# Patient Record
Sex: MALE | Race: WHITE | Employment: FULL TIME | URBAN - NONMETROPOLITAN AREA
[De-identification: names, ages, dates, MRNs, and addresses within clinical notes are randomized per-mention and may not be internally consistent; named-entity substitution may affect disease eponyms.]

---

## 2021-08-24 ENCOUNTER — OFFICE VISIT (OUTPATIENT)
Dept: PRIMARY CARE CLINIC | Age: 21
End: 2021-08-24
Payer: MEDICAID

## 2021-08-24 VITALS
TEMPERATURE: 97.8 F | SYSTOLIC BLOOD PRESSURE: 138 MMHG | OXYGEN SATURATION: 96 % | RESPIRATION RATE: 18 BRPM | WEIGHT: 290 LBS | HEART RATE: 96 BPM | DIASTOLIC BLOOD PRESSURE: 62 MMHG

## 2021-08-24 DIAGNOSIS — Z11.52 ENCOUNTER FOR SCREENING FOR COVID-19: Primary | ICD-10-CM

## 2021-08-24 DIAGNOSIS — J40 BRONCHITIS: ICD-10-CM

## 2021-08-24 DIAGNOSIS — Z20.828 EXPOSURE TO THE FLU: ICD-10-CM

## 2021-08-24 DIAGNOSIS — Z11.52 ENCOUNTER FOR SCREENING FOR COVID-19: ICD-10-CM

## 2021-08-24 DIAGNOSIS — J02.9 SORE THROAT: Primary | ICD-10-CM

## 2021-08-24 DIAGNOSIS — R06.2 WHEEZING: ICD-10-CM

## 2021-08-24 LAB
INFLUENZA A ANTIBODY: NEGATIVE
INFLUENZA B ANTIBODY: NEGATIVE
S PYO AG THROAT QL: NORMAL

## 2021-08-24 PROCEDURE — 87804 INFLUENZA ASSAY W/OPTIC: CPT | Performed by: NURSE PRACTITIONER

## 2021-08-24 PROCEDURE — G8427 DOCREV CUR MEDS BY ELIG CLIN: HCPCS | Performed by: NURSE PRACTITIONER

## 2021-08-24 PROCEDURE — 4004F PT TOBACCO SCREEN RCVD TLK: CPT | Performed by: NURSE PRACTITIONER

## 2021-08-24 PROCEDURE — 99999 PR OFFICE/OUTPT VISIT,PROCEDURE ONLY: CPT | Performed by: NURSE PRACTITIONER

## 2021-08-24 PROCEDURE — 99213 OFFICE O/P EST LOW 20 MIN: CPT | Performed by: NURSE PRACTITIONER

## 2021-08-24 PROCEDURE — 87880 STREP A ASSAY W/OPTIC: CPT | Performed by: NURSE PRACTITIONER

## 2021-08-24 PROCEDURE — G8421 BMI NOT CALCULATED: HCPCS | Performed by: NURSE PRACTITIONER

## 2021-08-24 RX ORDER — ALBUTEROL SULFATE 90 UG/1
2 AEROSOL, METERED RESPIRATORY (INHALATION) EVERY 4 HOURS PRN
Qty: 1 INHALER | Refills: 0 | Status: SHIPPED | OUTPATIENT
Start: 2021-08-24

## 2021-08-24 RX ORDER — BUPROPION HYDROCHLORIDE 150 MG/1
150 TABLET ORAL EVERY MORNING
COMMUNITY

## 2021-08-24 RX ORDER — OSELTAMIVIR PHOSPHATE 75 MG/1
75 CAPSULE ORAL DAILY
Qty: 10 CAPSULE | Refills: 0 | Status: SHIPPED | OUTPATIENT
Start: 2021-08-24 | End: 2021-09-03

## 2021-08-24 RX ORDER — METHYLPREDNISOLONE SODIUM SUCCINATE 125 MG/2ML
125 INJECTION, POWDER, LYOPHILIZED, FOR SOLUTION INTRAMUSCULAR; INTRAVENOUS ONCE
Status: COMPLETED | OUTPATIENT
Start: 2021-08-24 | End: 2021-08-24

## 2021-08-24 RX ADMIN — METHYLPREDNISOLONE SODIUM SUCCINATE 125 MG: 125 INJECTION, POWDER, LYOPHILIZED, FOR SOLUTION INTRAMUSCULAR; INTRAVENOUS at 14:44

## 2021-08-24 ASSESSMENT — ENCOUNTER SYMPTOMS
SORE THROAT: 1
DIARRHEA: 0
VOMITING: 0
SHORTNESS OF BREATH: 0
NAUSEA: 0
COUGH: 1
CHEST TIGHTNESS: 0
WHEEZING: 1

## 2021-08-24 NOTE — PATIENT INSTRUCTIONS
Patient REFUSED COVID TESTING DUE TO just exposed to INFLUENZA in the household. STEROID INJ TODAY. RX antiviral and inhaler to pharmacy on file. If shortness of breath or worsening symptoms please seek care at nearest ER.

## 2021-08-24 NOTE — PROGRESS NOTES
[address] [facility] [place] [address]  Dept: [phone]  Dept Fax: [phone]: [phone]     Visit type: New patient    Reason for Visit: Pharyngitis (Since this morning. Patient states spouse and child tested positive for flu lastweek) and Nasal Congestion (Nasal drainage since this morning)      Assessment and Plan       1. Sore throat  -     POCT rapid strep A  -     POCT Influenza A/B  2. Encounter for screening for COVID-19  3. Exposure to the flu  -     oseltamivir (TAMIFLU) 75 MG capsule; Take 1 capsule by mouth daily for 10 days, Disp-10 capsule, R-0Normal  4. Wheezing  -     albuterol sulfate HFA (VENTOLIN HFA) 108 (90 Base) MCG/ACT inhaler; Inhale 2 puffs into the lungs every 4 hours as needed for Wheezing, Disp-1 Inhaler, R-0Normal  5. Bronchitis      ICD-10-CM    1. Sore throat  J02.9 POCT rapid strep A     POCT Influenza A/B   2. Encounter for screening for COVID-19  Z11.52    3. Exposure to the flu  Z20.828 oseltamivir (TAMIFLU) 75 MG capsule   4. Wheezing  R06.2 albuterol sulfate HFA (VENTOLIN HFA) 108 (90 Base) MCG/ACT inhaler   5. Bronchitis  J40          Return if symptoms worsen or fail to improve. Patient REFUSED COVID TESTING DUE TO just exposed to INFLUENZA in the household. STEROID INJ TODAY. RX antiviral and inhaler to pharmacy on file. If shortness of breath or worsening symptoms please seek care at nearest ER. Provided written recommendations on viral strategies including c,d,zinc, mucinex, TYL or IBUPROFEN and when to seek emergency care . Subjective       Patient notes sudden onset sore throat and wheezing that began this morning. He notes symptoms of runny nose fatigue sudden onset last week and his children wife he tested positive for influenza at the end of last week. He is afebrile. He denies any known potential Covid exposures.   He notes that wife and child did test negative for Covid SpO2 96%   Physical Exam  Vitals and nursing note reviewed. Constitutional:       General: He is not in acute distress. Appearance: Normal appearance. He is normal weight. He is not ill-appearing, toxic-appearing or diaphoretic. HENT:      Head: Normocephalic. Right Ear: External ear normal.      Left Ear: External ear normal.      Nose: Congestion present. No rhinorrhea. Mouth/Throat:      Pharynx: Posterior oropharyngeal erythema present. No oropharyngeal exudate. Eyes:      Pupils: Pupils are equal, round, and reactive to light. Cardiovascular:      Rate and Rhythm: Normal rate. Heart sounds: Normal heart sounds. Pulmonary:      Effort: Pulmonary effort is normal.      Breath sounds: No stridor. Wheezing (diffuse lessen with deep breath and cough) present. No rhonchi. Musculoskeletal:      Cervical back: Normal range of motion. Neurological:      Mental Status: He is alert.                                DEVORA Otoole - CNP

## 2021-08-26 LAB — SARS-COV-2, PCR: NOT DETECTED

## 2022-09-16 ENCOUNTER — HOSPITAL ENCOUNTER (OUTPATIENT)
Dept: GENERAL RADIOLOGY | Age: 22
Discharge: HOME | End: 2022-09-16
Payer: COMMERCIAL

## 2022-09-16 DIAGNOSIS — N20.0 STONE, KIDNEY: ICD-10-CM

## 2022-09-16 PROCEDURE — 74018 RADEX ABDOMEN 1 VIEW: CPT | Performed by: RADIOLOGY

## 2022-09-16 PROCEDURE — 74018 RADEX ABDOMEN 1 VIEW: CPT

## 2022-11-16 ENCOUNTER — APPOINTMENT (OUTPATIENT)
Dept: GENERAL RADIOLOGY | Age: 22
End: 2022-11-16
Payer: COMMERCIAL

## 2022-11-16 ENCOUNTER — HOSPITAL ENCOUNTER (EMERGENCY)
Age: 22
Discharge: HOME | End: 2022-11-16
Attending: EMERGENCY MEDICINE
Payer: COMMERCIAL

## 2022-11-16 VITALS
SYSTOLIC BLOOD PRESSURE: 160 MMHG | TEMPERATURE: 100.6 F | WEIGHT: 280 LBS | HEART RATE: 106 BPM | OXYGEN SATURATION: 93 % | DIASTOLIC BLOOD PRESSURE: 95 MMHG | RESPIRATION RATE: 20 BRPM

## 2022-11-16 DIAGNOSIS — J10.1 INFLUENZA A: Primary | ICD-10-CM

## 2022-11-16 LAB
RAPID INFLUENZA  B AGN: NEGATIVE
RAPID INFLUENZA A AGN: POSITIVE
SARS-COV-2, NAAT: NOT DETECTED

## 2022-11-16 PROCEDURE — 87804 INFLUENZA ASSAY W/OPTIC: CPT

## 2022-11-16 PROCEDURE — 6370000000 HC RX 637 (ALT 250 FOR IP): Performed by: EMERGENCY MEDICINE

## 2022-11-16 PROCEDURE — 99284 EMERGENCY DEPT VISIT MOD MDM: CPT

## 2022-11-16 PROCEDURE — 87635 SARS-COV-2 COVID-19 AMP PRB: CPT

## 2022-11-16 PROCEDURE — 71046 X-RAY EXAM CHEST 2 VIEWS: CPT

## 2022-11-16 RX ORDER — DEXTROMETHORPHAN HYDROBROMIDE AND PROMETHAZINE HYDROCHLORIDE 15; 6.25 MG/5ML; MG/5ML
5 SYRUP ORAL 4 TIMES DAILY PRN
Qty: 100 ML | Refills: 0 | Status: SHIPPED | OUTPATIENT
Start: 2022-11-16 | End: 2022-11-16 | Stop reason: CLARIF

## 2022-11-16 RX ORDER — DEXTROMETHORPHAN HYDROBROMIDE AND PROMETHAZINE HYDROCHLORIDE 15; 6.25 MG/5ML; MG/5ML
5 SYRUP ORAL 4 TIMES DAILY PRN
Qty: 100 ML | Refills: 0 | Status: SHIPPED | OUTPATIENT
Start: 2022-11-16 | End: 2022-11-23

## 2022-11-16 RX ORDER — GUAIFENESIN AND DEXTROMETHORPHAN HYDROBROMIDE 100; 10 MG/5ML; MG/5ML
10 SOLUTION ORAL ONCE
Status: COMPLETED | OUTPATIENT
Start: 2022-11-16 | End: 2022-11-16

## 2022-11-16 RX ADMIN — GUAIFENESIN SYRUP AND DEXTROMETHORPHAN 10 ML: 100; 10 SYRUP ORAL at 13:50

## 2022-11-16 ASSESSMENT — PAIN - FUNCTIONAL ASSESSMENT: PAIN_FUNCTIONAL_ASSESSMENT: 0-10

## 2022-11-16 ASSESSMENT — ENCOUNTER SYMPTOMS
DIARRHEA: 1
SHORTNESS OF BREATH: 1
SORE THROAT: 1
ABDOMINAL PAIN: 0
VOMITING: 1
COUGH: 1

## 2022-11-16 ASSESSMENT — PAIN SCALES - GENERAL: PAINLEVEL_OUTOF10: 4

## 2022-11-16 NOTE — LETTER
[facility] EMERGENCY DEPT  [address]  Phone: [phone]               November 16, 2022    Patient: Cordell De La O   YOB: 2000   Date of Visit: 11/16/2022       To Whom It May Concern:    Cordell De La O was seen and treated in our emergency department on 11/16/2022. He may return on 11/20/22.       Sincerely,       Rochelle Gutiérrez RN         Signature:__________________________________

## 2022-11-16 NOTE — ED NOTES
Pt requesting prescriptions be changed to [facility] in [place] near [place].  Dr. Garcia Res aware at 15:20     Luisa Nieto RN  11/16/22 1821

## 2022-11-16 NOTE — ED PROVIDER NOTES
[facility] EMERGENCY DEPT  EMERGENCY DEPARTMENT ENCOUNTER      Pt Name: Jez Clark  MRN: [id number]  [place] 2000  Date of evaluation: 11/16/2022  Provider: Son [address]       Chief Complaint   Patient presents with    Cough     Cough with emesis, concern for flu           HISTORY OF PRESENT ILLNESS   (Location/Symptom, Timing/Onset, Context/Setting, Quality, Duration, Modifying Factors, Severity)  Note limiting factors. Jez Clark is a 23 y.o. male who presents to the emergency department     HPI    Chief complaint:  cough  Onset: 3 days ago  Timing: gradual onset  Region/radiation: respiratory  Quality: dry  Severity: severe  Exacerbating factors: denies  Alleviating factors: Tylenol Cold and flu with minimal relief  Associated symptoms: body aches, vomiting (resolved), diarrhea (resolved), sore throat, shortness of breath  Denies: abdominal pain, headache, dizziness, syncope, chest pain, fever/chills    Additional History: The patient is a 22 yo M who presents to the ED c/o cough. He says his \"whole family\" is sick with influenza A with same symptoms. 3 days ago he had vomiting and diarrhea which have since resolved. He then started cough, sore throat, body aches. Cough is minimally productive. There are no other complaints or concerns at this time. Nursing Notes were reviewed. REVIEW OF SYSTEMS    (2-9 systems for level 4, 10 or more for level 5)     Review of Systems   Constitutional:  Negative for chills and fever. HENT:  Positive for sore throat. Negative for ear discharge. Respiratory:  Positive for cough and shortness of breath. Cardiovascular:  Negative for chest pain and palpitations. Gastrointestinal:  Positive for diarrhea and vomiting. Negative for abdominal pain. Genitourinary:  Negative for dysuria, frequency and urgency. Musculoskeletal:  Positive for myalgias. Negative for joint swelling. Skin:  Negative for rash and wound.    Neurological: Negative for syncope and headaches. Hematological:  Negative for adenopathy. Does not bruise/bleed easily. Psychiatric/Behavioral:  Negative for dysphoric mood and sleep disturbance. Except as noted above the remainder of the review of systems was reviewed and negative. PAST MEDICAL HISTORY     Past Medical History:   Diagnosis Date    Bipolar 1 disorder ([facility] [address]     Depression          SURGICAL HISTORY     History reviewed. No pertinent surgical history. CURRENT MEDICATIONS       Previous Medications    ALBUTEROL SULFATE HFA (VENTOLIN HFA) 108 (90 BASE) MCG/ACT INHALER    Inhale 2 puffs into the lungs every 4 hours as needed for Wheezing    BUPROPION (WELLBUTRIN XL) 150 MG EXTENDED RELEASE TABLET    Take 150 mg by mouth every morning       ALLERGIES     Demerol hcl [meperidine], Morphine, and Sulfa antibiotics    FAMILY HISTORY     History reviewed. No pertinent family history. SOCIAL HISTORY       Social History     Socioeconomic History    Marital status: Single     Spouse name: None    Number of children: None    Years of education: None    Highest education level: None   Tobacco Use    Smoking status: Every Day     Packs/day: 1.00     Types: Cigarettes   Substance and Sexual Activity    Alcohol use: Yes     Comment: occasional    Drug use: No       SCREENINGS         H[place]ill Coma Scale  Eye Opening: Spontaneous  Best Verbal Response: Oriented  Best Motor Response: Obeys commands  Royer Coma Scale Score: 15                     CIWA Assessment  BP: (!) 139/96  Heart Rate: (!) 101                 PHYSICAL EXAM    (up to 7 for level 4, 8 or more for level 5)       ED Triage Vitals [11/16/22 1259]   BP Temp Temp Source Heart Rate Resp SpO2 Height Weight   (!) 139/96 98.1 °F (36.7 °C) Tympanic (!) 101 18 95 % -- 280 lb (127 kg)       Physical Exam    Vital signs and nursing notes reviewed    General:  Awake, alert, NAD. HEENT: Normocephalic, atraumatic. EOMI.  Mucous membranes are moist. No visible drainge from ears or nose. Neck:  Normal ROM. No meningismus. Cardiovascular:  Regular rate and regular rhythm. No appreciable murmurs. Radial pulses are 2+. No cyanosis. Lungs/Chest: No respiratory distress. There are no audible wheezes. No appreciable rales, rhonchi or wheezes. No stridor. Speaking in full sentences. Equal chest rise with inspiration. No accessory muscle usage. Abdomen: Soft, nontender, non distended. No rebound, gurading, rigitidy. Back: No CVA tenderness. No midline bony tenderness. Extremities: Normal ROM, no edema. No calf tenderness. No appreciable joint swelling. Skin:  Warm, dry. No visible rashes. No cyanosis, erythema or pallor. Neurologic:  Awake, alert, oriented to person, place, time, situation. Speech is clear. Psychiatric:  Normal interaction and behavior. DIAGNOSTIC RESULTS     EKG:none    RADIOLOGY:   Non-plain film images such as CT, Ultrasound and MRI are read by the radiologist. Plain radiographic images are visualized and preliminarily interpreted by the emergency physician with the below findings:        Interpretation per the Radiologist below, if available at the time of this note:    XR CHEST (2 VW)   Final Result   NO ACUTE CARDIOPULMONARY PROCESS. NO EVIDENCE OF AIRSPACE CONSOLIDATION OR PULMONARY VENOUS CONGESTION. ED BEDSIDE ULTRASOUND:   Performed by ED Physician - none    LABS:  Labs Reviewed   COVID-19, RAPID   RAPID INFLUENZA A/B ANTIGENS       All other labs were within normal range or not returned as of this dictation. EMERGENCY DEPARTMENT COURSE and DIFFERENTIAL DIAGNOSIS/MDM:   Vitals:    Vitals:    11/16/22 1259   BP: (!) 139/96   Pulse: (!) 101   Resp: 18   Temp: 98.1 °F (36.7 °C)   TempSrc: Tympanic   SpO2: 95%   Weight: 280 lb (127 kg)           MDM  The patient was resting comfortably on recheck prior to disposition. The results with the patient and his mother at bedside. COVID swab was negative.   He has positive for influenza A. He is out of the window for Tamiflu. He should rest and stay well-hydrated. I will send a prescription for medication for his cough to his pharmacy. He was instructed to schedule follow-up appointment with his PCP for recheck within 1 week. He was instructed to return to the emergency department if symptoms worsen - especially with wosening shortness of breath, worsening cough, persistent fevers or with any worsening symptoms or concerns. The patient and his mother verbalized understanding and agreement with the plan. All questions answered. REASSESSMENT      1450 Recheck: resting comfortably, feeling better after med. Results explained. Ready to go home    CRITICAL CARE TIME       CONSULTS:  None    PROCEDURES:  Unless otherwise noted below, none     Procedures        FINAL IMPRESSION      1. Influenza A          DISPOSITION/PLAN   DISPOSITION  Home      PATIENT REFERRED TO:  Will Jurado  [address] [place] [address]. [address] [place] [address]    Schedule an appointment as soon as possible for a visit in 1 week  For a recheck    [facility]  [phone]    If symptoms worsen - especially with wosening shortness of breath, worsening cough, persistent fevers or with any worsening symptoms or concerns. DISCHARGE MEDICATIONS:  New Prescriptions    PROMETHAZINE-DEXTROMETHORPHAN (PROMETHAZINE-DM) 6.25-15 MG/5ML SYRUP    Take 5 mLs by mouth 4 times daily as needed for Cough     Controlled Substances Monitoring:     No flowsheet data found.     (Please note that portions of this note were completed with a voice recognition program.  Efforts were made to edit the dictations but occasionally words are mis-transcribed.)    Calvin Cardoza DO (electronically signed)  Attending Emergency Physician           Calvin Cardoza DO  11/16/22 5584